# Patient Record
Sex: MALE | Race: WHITE | Employment: OTHER | URBAN - METROPOLITAN AREA
[De-identification: names, ages, dates, MRNs, and addresses within clinical notes are randomized per-mention and may not be internally consistent; named-entity substitution may affect disease eponyms.]

---

## 2017-01-04 RX ORDER — ONDANSETRON 4 MG/1
4 TABLET, ORALLY DISINTEGRATING ORAL
Qty: 90 TAB | Refills: 5 | Status: CANCELLED | OUTPATIENT
Start: 2017-01-04

## 2017-01-09 ENCOUNTER — OFFICE VISIT (OUTPATIENT)
Dept: NEUROLOGY | Age: 82
End: 2017-01-09

## 2017-01-09 VITALS
HEART RATE: 66 BPM | OXYGEN SATURATION: 98 % | DIASTOLIC BLOOD PRESSURE: 74 MMHG | RESPIRATION RATE: 18 BRPM | SYSTOLIC BLOOD PRESSURE: 124 MMHG

## 2017-01-09 DIAGNOSIS — G31.9 CEREBELLAR ATROPHY (HCC): Primary | ICD-10-CM

## 2017-01-09 DIAGNOSIS — R27.0 ATAXIA: ICD-10-CM

## 2017-01-09 DIAGNOSIS — R42 DIZZINESS: ICD-10-CM

## 2017-01-09 RX ORDER — GABAPENTIN 300 MG/1
CAPSULE ORAL
Qty: 60 CAP | Refills: 2 | Status: SHIPPED | OUTPATIENT
Start: 2017-01-09 | End: 2017-03-23 | Stop reason: SDUPTHER

## 2017-01-09 NOTE — MR AVS SNAPSHOT
Visit Information Date & Time Provider Department Dept. Phone Encounter #  
 1/9/2017  2:40 PM Ed [address] [facility] [phone] Follow-up Instructions Return in about 2 months (around 3/9/2017). Your Appointments 3/13/2017  2:30 PM  
ROUTINE CARE with Sara Abbasi DO [facility] Internal Medicine ([facility]) Appt Note: 3 month f/u  
 [address] [facility] [address] [place] [address]  
[phone]  
  
   
 [address] [place] [address] [address] Upcoming Health Maintenance Date Due DTaP/Tdap/Td series (1 - Tdap) 12/22/1939 ZOSTER VACCINE AGE 60> 12/22/1978 GLAUCOMA SCREENING Q2Y 12/22/1983 MEDICARE YEARLY EXAM 12/22/1983 Pneumococcal 65+ Low/Medium Risk (2 of 2 - PPSV23) 11/11/2017 Allergies as of 1/9/2017  Review Complete On: 1/9/2017 By: Blayne Umana DO No Known Allergies Current Immunizations  Reviewed on 11/11/2016 Name Date Influenza High Dose Vaccine PF 9/23/2016 Pneumococcal Conjugate (PCV-13) 11/11/2016 Not reviewed this visit You Were Diagnosed With   
  
 Codes Comments Cerebellar atrophy    -  Primary ICD-10-CM: G31.9 ICD-9-CM: 331.9 Dizziness     ICD-10-CM: H30 ICD-9-CM: 780.4 Ataxia     ICD-10-CM: R27.0 ICD-9-CM: 999. 3 Vitals BP Pulse Resp SpO2 Smoking Status 124/74 66 18 98% Never Smoker Preferred Pharmacy Pharmacy Name Phone [facility], [address] [place] [address] [phone] Your Updated Medication List  
  
   
This list is accurate as of: 1/9/17  3:10 PM.  Always use your most recent med list.  
  
  
  
  
 AGGRENOX  mg per SR capsule Generic drug:  aspirin-dipyridamole Take 1 Cap by mouth two (2) times a day. amLODIPine 2.5 mg tablet Commonly known as:  Soumya Raman Take 1 Tab by mouth daily. CLARITIN 10 mg tablet Generic drug:  loratadine Take 10 mg by mouth.  
  
 gabapentin 300 mg capsule Commonly known as:  NEURONTIN Start 1 tablet qhs x 1 week, then increase to 2 tablets qhs  
  
 hydroCHLOROthiazide 25 mg tablet Commonly known as:  HYDRODIURIL Take 1 Tab by mouth daily. LOSARTAN PO Take 50 mg by mouth daily. NexIUM 40 mg capsule Generic drug:  esomeprazole Take  by mouth every other day. ondansetron 4 mg disintegrating tablet Commonly known as:  ZOFRAN ODT Take 1 Tab by mouth every eight (8) hours as needed for Nausea. PROSCAR 5 mg tablet Generic drug:  finasteride Take 5 mg by mouth daily. RAPAFLO 8 mg capsule Generic drug:  silodosin Take 8 mg by mouth daily (with breakfast). VITAMIN B-12 PO Take  by mouth. Prescriptions Sent to Pharmacy Refills  
 gabapentin (NEURONTIN) 300 mg capsule 2 Sig: Start 1 tablet qhs x 1 week, then increase to 2 tablets qhs  
 Class: Normal  
 Pharmacy: [facility] [address] Ph #: [phone] We Performed the Following HEAVY METALS PROFILE I, BLOOD [75034 CPT(R)] PARANEOPLASTIC AUTO-AB EVAL [[id number] Custom] Follow-up Instructions Return in about 2 months (around 3/9/2017). To-Do List   
 01/09/2017 Imaging:  MRA BRAIN W WO CONT   
  
 01/09/2017 Imaging:  MRA NECK W CONT   
  
 01/09/2017 Imaging:  MRI BRAIN W WO CONT Patient Instructions A Healthy Lifestyle: Care Instructions Your Care Instructions A healthy lifestyle can help you feel good, stay at a healthy weight, and have plenty of energy for both work and play. A healthy lifestyle is something you can share with your whole family.  
A healthy lifestyle also can lower your risk for serious health problems, such as high blood pressure, heart disease, and diabetes. You can follow a few steps listed below to improve your health and the health of your family. Follow-up care is a key part of your treatment and safety. Be sure to make and go to all appointments, and call your doctor if you are having problems. Its also a good idea to know your test results and keep a list of the medicines you take. How can you care for yourself at home? · Do not eat too much sugar, fat, or fast foods. You can still have dessert and treats now and then. The goal is moderation. · Start small to improve your eating habits. Pay attention to portion sizes, drink less juice and soda pop, and eat more fruits and vegetables. ¨ Eat a healthy amount of food. A 3-ounce serving of meat, for example, is about the size of a deck of cards. Fill the rest of your plate with vegetables and whole grains. ¨ Limit the amount of soda and sports drinks you have every day. Drink more water when you are thirsty. ¨ Eat at least 5 servings of fruits and vegetables every day. It may seem like a lot, but it is not hard to reach this goal. A serving or helping is 1 piece of fruit, 1 cup of vegetables, or 2 cups of leafy, raw vegetables. Have an apple or some carrot sticks as an afternoon snack instead of a candy bar. Try to have fruits and/or vegetables at every meal. 
· Make exercise part of your daily routine. You may want to start with simple activities, such as walking, bicycling, or slow swimming. Try to be active 30 to 60 minutes every day. You do not need to do all 30 to 60 minutes all at once. For example, you can exercise 3 times a day for 10 or 20 minutes. Moderate exercise is safe for most people, but it is always a good idea to talk to your doctor before starting an exercise program. 
· Keep moving. Chepe Singh the lawn, work in the garden, or [employer]. Take the stairs instead of the elevator at work. · If you smoke, quit. People who smoke have an increased risk for heart attack, stroke, cancer, and other lung illnesses. Quitting is hard, but there are ways to boost your chance of quitting tobacco for good. ¨ Use nicotine gum, patches, or lozenges. ¨ Ask your doctor about stop-smoking programs and medicines. ¨ Keep trying. In addition to reducing your risk of diseases in the future, you will notice some benefits soon after you stop using tobacco. If you have shortness of breath or asthma symptoms, they will likely get better within a few weeks after you quit. · Limit how much alcohol you drink. Moderate amounts of alcohol (up to 2 drinks a day for men, 1 drink a day for women) are okay. But drinking too much can lead to liver problems, high blood pressure, and other health problems. Family health If you have a family, there are many things you can do together to improve your health. · Eat meals together as a family as often as possible. · Eat healthy foods. This includes fruits, vegetables, lean meats and dairy, and whole grains. · Include your family in your fitness plan. Most people think of activities such as jogging or tennis as the way to fitness, but there are many ways you and your family can be more active. Anything that makes you breathe hard and gets your heart pumping is exercise. Here are some tips: 
¨ Walk to do errands or to take your child to school or the bus. ¨ Go for a family bike ride after dinner instead of watching TV. Where can you learn more? Go to http://tristian-catie.info/. Enter I558 in the search box to learn more about \"A Healthy Lifestyle: Care Instructions. \" Current as of: July 26, 2016 Content Version: 11.1 © 9865-9441 [employer]. Care instructions adapted under license by [employer] (which disclaims liability or warranty for this information).  If you have questions about a medical condition or this instruction, always ask your healthcare professional. [place] [address] any warranty or liability for your use of this information. Introducing [facility] [place] [facility]! [employer] introduces UPlanMe patient portal. Now you can access parts of your medical record, email your doctor's office, and request medication refills online. 1. In your internet browser, go to [email or web address] 2. Click on the First Time User? Click Here link in the Sign In box. You will see the New Member Sign Up page. 3. Enter your UPlanMe Access Code exactly as it appears below. You will not need to use this code after youve completed the sign-up process. If you do not sign up before the expiration date, you must request a new code. · UPlanMe Access Code: [email or web address] Expires: 4/9/2017  2:31 PM 
 
4. Enter the last four digits of your Social Security Number (xxxx) and Date of Birth (mm/dd/yyyy) as indicated and click Submit. You will be taken to the next sign-up page. 5. Create a UPlanMe ID. This will be your UPlanMe login ID and cannot be changed, so think of one that is secure and easy to remember. 6. Create a UPlanMe password. You can change your password at any time. 7. Enter your Password Reset Question and Answer. This can be used at a later time if you forget your password. 8. Enter your e-mail address. You will receive e-mail notification when new information is available in [address] 9. Click Sign Up. You can now view and download portions of your medical record. 10. Click the Download Summary menu link to download a portable copy of your medical information. If you have questions, please visit the Frequently Asked Questions section of the UPlanMe website. Remember, UPlanMe is NOT to be used for urgent needs. For medical emergencies, dial 911. Now available from your iPhone and Android! Please provide this summary of care documentation to your next provider. Your primary care clinician is listed as Flavio Howard. If you have any questions after today's visit, please call [phone].

## 2017-01-09 NOTE — PATIENT INSTRUCTIONS

## 2017-01-09 NOTE — PROGRESS NOTES
Neurology Clinic Follow up Note    Patient ID:  Tanesha Matos  [id number]  56 y.o.  12/22/1918      Mr. Brody Roldan is here for follow up today of dizziness, dysarthria      Interval History:   Pt here for f/u regarding dizziness, dysarthria, ataxia. Residing at [place] [address]. Feels sx are progressive. Several falls since last visit. Using a wheelchair at all times. Using a bar to reduce need for transfers. Speech is more slurred. Saw speech therapy but did not feel this was helpful. Still reporting dizziness/lightheadedness, nausea/vomiting with any movement. Saw ENT and did not think this was a peripheral issue. Reporting new onset jerking/twitching of his legs x 1 month lasting all night long and affecting sleep. Denies LE paresthesias. Difficulty with blurred vision ongoing for years but worsening over the past month. Feels it is becoming more difficulty to control his hands. Difficulty feeding himself. MRI Brain completed w/o posterior circ ischemia but notable cerebellar atrophy. No FHx of similar gait/balance issues. PMHx/ PSHx/ FHx/ SHx:  Reviewed and unchanged previous visit. ROS:  Comprehensive review of systems negative except for as noted above. Objective:       Meds:  Current Outpatient Prescriptions   Medication Sig Dispense Refill    ondansetron (ZOFRAN ODT) 4 mg disintegrating tablet Take 1 Tab by mouth every eight (8) hours as needed for Nausea. 90 Tab 5    hydroCHLOROthiazide (HYDRODIURIL) 25 mg tablet Take 1 Tab by mouth daily. 90 Tab 3    amLODIPine (NORVASC) 2.5 mg tablet Take 1 Tab by mouth daily. 90 Tab 3    aspirin-dipyridamole (AGGRENOX)  mg per SR capsule Take 1 Cap by mouth two (2) times a day.  silodosin (RAPAFLO) 8 mg capsule Take 8 mg by mouth daily (with breakfast).  loratadine (CLARITIN) 10 mg tablet Take 10 mg by mouth.  finasteride (PROSCAR) 5 mg tablet Take 5 mg by mouth daily.       esomeprazole (NEXIUM) 40 mg capsule Take  by mouth every other day.  LOSARTAN PO Take 50 mg by mouth daily.  CYANOCOBALAMIN, VITAMIN B-12, (VITAMIN B-12 PO) Take  by mouth. Exam:  Visit Vitals    /74    Pulse 66    Resp 18    SpO2 98%     NEUROLOGICAL EXAM:  General: Awake, alert, severe dysarthria. CN: PERRL, EOMI with impaired saccades, +nystagmus, VFF to confrontation, facial sensation and strength are normal and symmetric, hearing is intact to finger rub bilaterally, palate and tongue movements are intact and symmetric. Motor: 5/5 UE, 4/5 proximal LE b/l  Reflexes: Deferred  Coordination: slightly ataxic FTN b/l  Sensation: LT intact throughout. Gait: Wheelchair bound, not able to ambulate    LABS  Results for orders placed or performed in visit on 11/07/16   VITAMIN E   Result Value Ref Range    Vitamin E (Alpha Tocopherol) 18.6 (H) 5.3 - 17.5 mg/L   VITAMIN B12   Result Value Ref Range    Vitamin B12 1669 (H) 211 - 946 pg/mL   TSH 3RD GENERATION   Result Value Ref Range    TSH 1.970 0.450 - 4.500 uIU/mL   COPPER   Result Value Ref Range    Copper, serum 107 72 - 166 ug/dL   CBC+HEMATOLOGY REVIEW   Result Value Ref Range    WBC 6.8 3.4 - 10.8 x10E3/uL    RBC 3.64 (L) 4.14 - 5.80 x10E6/uL    HGB 9.6 (L) 12.6 - 17.7 g/dL    HCT 29.8 (L) 37.5 - 51.0 %    MCV 82 79 - 97 fL    MCH 26.4 (L) 26.6 - 33.0 pg    MCHC 32.2 31.5 - 35.7 g/dL    RDW 14.3 12.3 - 15.4 %    NEUTROPHILS 62 %    LYMPHOCYTES 24 %    MONOCYTES 11 %    EOSINOPHILS 2 %    BASOPHILS 1 %    ABS. NEUTROPHILS 4.2 1.4 - 7.0 X10E3/uL    ABS. LYMPHOCYTES 1.6 0.7 - 3.1 X10E3/uL    ABS. MONOCYTES 0.7 0.1 - 0.9 X10E3/uL    ABS. EOSINOPHILS 0.1 0.0 - 0.4 X10E3/uL    ABS.  BASOPHILS 0.1 0.0 - 0.2 X10E3/uL    Differential comment Comment     RBC COMMENT RBC's appear normal. Normal    PLATELET COMMENT Adequate Adequate       IMAGING:  MRI Results (most recent):    Results from [facility] encounter on 10/27/16   MRI BRAIN WO CONT   Narrative EXAM:  MRI BRAIN WO CONT  History: Dizziness, and balance, speech changes, abnormal eye movements  INDICATION:  dizziness/imbalance, dysarthria, nystagmus r/o posterior circ  infarction    COMPARISON: None. CONTRAST:  None. TECHNIQUE: Sagittal T1, axial FLAIR, T2,T1 and gradient echo images as well as  coronal T2 weighted images and axial diffusion weighted images of the head were  obtained. FINDINGS:    There is mild/moderate for patient age sulcal and ventricular prominence. Left  temporal atrophy greater than right temporal atrophy. In the periventricular  white matter there are scattered foci of increased signal intensity, this is  mild. . There is no Chiari or syrinx. The pituitary gland and infundibulum are  unremarkable. There is no intracranial hemorrhage. There is no intracranial  mass. There is no skull base mass. Major intracranial vascular flow-voids are  grossly unremarkable. The cerebellopontine angles are unremarkable as well. The  orbits are symmetric. The cavernous sinuses are symmetric as well. There is no  evidence of mass, hemorrhage, acute infarct or abnormal extra-axial fluid  collection. Normal appearing flow-voids are present in the vertebral, basilar  and carotid artery systems. The craniocervical junction is normal.    There is right frontal sinus disease which is moderate. The left frontal sinus  is clear minimal anterior ethmoid sinus disease. Maxillary sinuses are within  normal limits. Impression IMPRESSION:    There is no acute intracranial process. No intracranial mass, hemorrhage, acute infarction. There is mild to moderate left greater than right temporal predominant cerebral  atrophy. Mild chronic microvascular ischemic change for patient age. Assessment:     Encounter Diagnoses     ICD-10-CM ICD-9-CM   1. 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 Dizziness R42 780.4   3.  Ataxia R27.0 781.3     79 yo male vasculopath here for f/u of chronic dizziness exacerbated by positional changes/movement x 5+ years with more recent increasing severity/frequency, associated imbalance, slight ataxia,  and severe dysarthria. Neurological examination today with worsening dysarthria, impaired saccades, nystagmus, b/l UE ataxia and gait imbalance. Initial MRI Brain completed and no evidence of posterior circulation ischemia. Mod to severe cerebellar atophy present on review. 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 Given relatively rapid recent progression, favor repeat neuroimaging to assess for interval change and to include vessel imaging to ensure patency of vertebrobasilar arteries. Although rare, will assess for paraneoplastic, toxic etiologies as well. For new onset periodic limb movements in the evenings, will start low dose gabapentin and titrate as needed. Plan:   Paraneoplastic panel, Heavy metal screening  MRI Brain WWO/MRA Head/Neck  Start gabapentin 300mg qhs x 1 week, then increase if needed to 600mg qhs  Discussed need for higher level of care/nursing facility placement with pt and daughter given clinical worsening and recent recurrent falls. Both are in agreement. Follow-up Disposition:  Return in about 2 months (around 3/9/2017).     Signed:  Mayo Mckenna DO  1/9/2017  1:58 PM

## 2017-01-10 DIAGNOSIS — R42 DIZZINESS: Primary | ICD-10-CM

## 2017-01-10 DIAGNOSIS — R27.0 ATAXIA: ICD-10-CM

## 2017-01-11 ENCOUNTER — TELEPHONE (OUTPATIENT)
Dept: NEUROLOGY | Age: 82
End: 2017-01-11

## 2017-01-11 NOTE — TELEPHONE ENCOUNTER
Spoke with daughter, informed her that MRI/MRA were denied, new order for carotid ultra sound ordered. She verbalized understanding.

## 2017-01-12 ENCOUNTER — TELEPHONE (OUTPATIENT)
Dept: NEUROLOGY | Age: 82
End: 2017-01-12

## 2017-01-12 NOTE — TELEPHONE ENCOUNTER
Pt daughter has a few questions in regards to the conversation she had with Deny Even yesterday. Would also some information about nursing homes.    Please call home first,  [phone] or mobile second, [phone]

## 2017-01-13 NOTE — TELEPHONE ENCOUNTER
Spoke with daughter, informed her MRA's were denied, not MRI W WO brain. Provided information on assistance with nursing home placement.

## 2017-01-14 LAB
ACHR BIND AB SER-SCNC: 0 NMOL/L
AMPHIPHYSIN AB TITR SER: NEGATIVE TITER
ARSENIC BLD-MCNC: 3 UG/L (ref 2–23)
CLINICAL BIOCHEMIST REVIEW: NORMAL
CV2 IGG TITR SER: NEGATIVE TITER
GLIAL NUC TYPE 1 AB TITR SER: NEGATIVE TITER
HU1 AB TITR SER: NEGATIVE TITER
HU2 AB TITR SER IF: NEGATIVE TITER
HU3 AB TITR SER: NEGATIVE TITER
LEAD BLD-MCNC: 2 UG/DL (ref 0–19)
MERCURY BLD-MCNC: NORMAL UG/L (ref 0–14.9)
NACHR AB SER-SCNC: 0 NMOL/L
PCA-1 AB TITR SER: NEGATIVE TITER
PCA-2 AB TITR SER: NEGATIVE TITER
PCA-TR AB TITR SER: NEGATIVE TITER
REFLEX ADDED: NORMAL
STRIA MUS AB TITR SER: NEGATIVE TITER
VGCC-N BIND AB SER-SCNC: 0 NMOL/L
VGCC-P/Q BIND AB SER-SCNC: 0 NMOL/L
VGKC AB SER-SCNC: 0 NMOL/L

## 2017-02-17 ENCOUNTER — TELEPHONE (OUTPATIENT)
Dept: NEUROLOGY | Age: 82
End: 2017-02-17

## 2017-02-19 ENCOUNTER — APPOINTMENT (OUTPATIENT)
Dept: GENERAL RADIOLOGY | Age: 82
End: 2017-02-19
Attending: STUDENT IN AN ORGANIZED HEALTH CARE EDUCATION/TRAINING PROGRAM
Payer: MEDICARE

## 2017-02-19 ENCOUNTER — HOSPITAL ENCOUNTER (EMERGENCY)
Age: 82
Discharge: NURSING, REHAB OR LONG-TERM CARE | End: 2017-02-20
Attending: STUDENT IN AN ORGANIZED HEALTH CARE EDUCATION/TRAINING PROGRAM
Payer: MEDICARE

## 2017-02-19 ENCOUNTER — APPOINTMENT (OUTPATIENT)
Dept: CT IMAGING | Age: 82
End: 2017-02-19
Attending: STUDENT IN AN ORGANIZED HEALTH CARE EDUCATION/TRAINING PROGRAM
Payer: MEDICARE

## 2017-02-19 VITALS
DIASTOLIC BLOOD PRESSURE: 51 MMHG | BODY MASS INDEX: 28.25 KG/M2 | OXYGEN SATURATION: 95 % | SYSTOLIC BLOOD PRESSURE: 121 MMHG | TEMPERATURE: 98.1 F | HEART RATE: 75 BPM | RESPIRATION RATE: 18 BRPM | WEIGHT: 175 LBS

## 2017-02-19 DIAGNOSIS — S70.02XA HEMATOMA OF HIP, LEFT, INITIAL ENCOUNTER: Primary | ICD-10-CM

## 2017-02-19 PROCEDURE — 72170 X-RAY EXAM OF PELVIS: CPT

## 2017-02-19 PROCEDURE — 74011250637 HC RX REV CODE- 250/637: Performed by: STUDENT IN AN ORGANIZED HEALTH CARE EDUCATION/TRAINING PROGRAM

## 2017-02-19 PROCEDURE — 99284 EMERGENCY DEPT VISIT MOD MDM: CPT

## 2017-02-19 PROCEDURE — 72192 CT PELVIS W/O DYE: CPT

## 2017-02-19 RX ORDER — LOSARTAN POTASSIUM 25 MG/1
50 TABLET ORAL DAILY
COMMUNITY

## 2017-02-19 RX ORDER — HYDROCODONE BITARTRATE AND ACETAMINOPHEN 5; 325 MG/1; MG/1
1 TABLET ORAL
Qty: 8 TAB | Refills: 0 | Status: SHIPPED | OUTPATIENT
Start: 2017-02-19 | End: 2017-02-21

## 2017-02-19 RX ORDER — HYDROCODONE BITARTRATE AND ACETAMINOPHEN 10; 325 MG/1; MG/1
1 TABLET ORAL
Status: COMPLETED | OUTPATIENT
Start: 2017-02-19 | End: 2017-02-19

## 2017-02-19 RX ADMIN — HYDROCODONE BITARTRATE AND ACETAMINOPHEN 1 TABLET: 10; 325 TABLET ORAL at 23:18

## 2017-02-20 ENCOUNTER — HOSPITAL ENCOUNTER (OUTPATIENT)
Dept: ULTRASOUND IMAGING | Age: 82
Discharge: HOME | End: 2017-02-20
Attending: PSYCHIATRY & NEUROLOGY
Payer: MEDICARE

## 2017-02-20 ENCOUNTER — HOSPITAL ENCOUNTER (OUTPATIENT)
Dept: MRI IMAGING | Age: 82
Discharge: HOME | End: 2017-02-20
Attending: PSYCHIATRY & NEUROLOGY
Payer: MEDICARE

## 2017-02-20 VITALS — BODY MASS INDEX: 28.25 KG/M2 | WEIGHT: 175 LBS

## 2017-02-20 DIAGNOSIS — R42 DIZZINESS: ICD-10-CM

## 2017-02-20 DIAGNOSIS — R27.0 ATAXIA: ICD-10-CM

## 2017-02-20 DIAGNOSIS — G31.9 CEREBELLAR ATROPHY (HCC): ICD-10-CM

## 2017-02-20 LAB — CREAT BLD-MCNC: 1.2 MG/DL (ref 0.6–1.3)

## 2017-02-20 PROCEDURE — 70553 MRI BRAIN STEM W/O & W/DYE: CPT

## 2017-02-20 PROCEDURE — 82565 ASSAY OF CREATININE: CPT

## 2017-02-20 PROCEDURE — A9585 GADOBUTROL INJECTION: HCPCS | Performed by: PSYCHIATRY & NEUROLOGY

## 2017-02-20 PROCEDURE — 93880 EXTRACRANIAL BILAT STUDY: CPT

## 2017-02-20 PROCEDURE — 74011250636 HC RX REV CODE- 250/636: Performed by: PSYCHIATRY & NEUROLOGY

## 2017-02-20 RX ADMIN — GADOBUTROL 7.5 ML: 604.72 INJECTION INTRAVENOUS at 13:00

## 2017-02-20 NOTE — ED NOTES
Robert Brady RN at [facility] notified of pt's return. ETA from pt transport 40mins per [facility]. Pt and family updated at this time.

## 2017-02-20 NOTE — ED PROVIDER NOTES
HPI Comments: 80 y.o. male with past medical history significant for HTN, MI, stroke, peripheral neuropathy who presents from [facility] via EMS accompanied by daughter with chief complaint of leg pain. Daughter states patient has been complaining of left leg pain from the hip to the knee for \"a while\" that has worsened today. Daughter states patient's most recent fall was 1 week ago. Daughter states patient has not received any XR's. There are no other acute medical concerns at this time. Social hx: never smoker, no alcohol  PCP: Joey Chapman, DO    Note written by Zandra Medina, as dictated by Jade Rivas MD 9:52 PM     The history is provided by a relative. No  was used. Past Medical History:   Diagnosis Date    Hypertension     Myocardial infarct, old 26    Peripheral neuropathy ([facility] [address]     Peripheral polyneuropathy 10/5/2016    Stroke [facility])        Past Surgical History:   Procedure Laterality Date    Hx hernia repair  1987    Hx orthopaedic       trigger finger    Pr cardiac surg procedure unlist  1991     Bypass 1991 and carotid artery          Family History:   Problem Relation Age of Onset    Cancer Mother      colon cancer    Heart Disease Father     Heart Disease Sister     Cancer Sister      pancreatic cancer       Social History     Social History    Marital status:      Spouse name: N/A    Number of children: N/A    Years of education: N/A     Occupational History    Not on file. Social History Main Topics    Smoking status: Never Smoker    Smokeless tobacco: Never Used    Alcohol use No      Comment: none     Drug use: No    Sexual activity: No      Comment:  has 2 children     Other Topics Concern    Not on file     Social History Narrative         ALLERGIES: Review of patient's allergies indicates no known allergies. Review of Systems   Constitutional: Negative for chills, diaphoresis, fatigue and fever. HENT: Negative for congestion, rhinorrhea, sinus pressure, sore throat, trouble swallowing and voice change. Eyes: Negative for photophobia and visual disturbance. Respiratory: Negative for cough, chest tightness and shortness of breath. Cardiovascular: Negative for chest pain, palpitations and leg swelling. Gastrointestinal: Negative for abdominal pain, blood in stool, constipation, diarrhea, nausea and vomiting. Musculoskeletal: Positive for arthralgias (left leg). Negative for myalgias and neck pain. Neurological: Negative for dizziness, light-headedness, numbness and headaches. Vitals:    02/19/17 2106   BP: 169/60   Pulse: 75   Resp: 18   Temp: 98.2 °F (36.8 °C)   SpO2: 99%   Weight: 79.4 kg (175 lb)            Physical Exam   Constitutional: He is oriented to person, place, and time. He appears well-developed and well-nourished. No distress. HENT:   Head: Normocephalic and atraumatic. Nose: Nose normal.   Mouth/Throat: Oropharynx is clear and moist. No oropharyngeal exudate. Eyes: Conjunctivae and EOM are normal. Right eye exhibits no discharge. Left eye exhibits no discharge. No scleral icterus. Neck: Normal range of motion. Neck supple. No JVD present. No tracheal deviation present. No thyromegaly present. Cardiovascular: Normal rate, regular rhythm, normal heart sounds and intact distal pulses. Exam reveals no gallop and no friction rub. No murmur heard. Pulmonary/Chest: Effort normal and breath sounds normal. No stridor. No respiratory distress. He has no wheezes. He has no rales. He exhibits no tenderness. Abdominal: Bowel sounds are normal. He exhibits no distension and no mass. There is no tenderness. There is no rebound. Musculoskeletal: Normal range of motion. He exhibits no edema or tenderness. Left leg tender at left lateral hip, mild deformity noted   Lymphadenopathy:     He has no cervical adenopathy.    Neurological: He is alert and oriented to person, place, and time. No cranial nerve deficit. Coordination normal.   Skin: Skin is warm and dry. No rash noted. He is not diaphoretic. No erythema. No pallor. Psychiatric: He has a normal mood and affect. His behavior is normal. Judgment and thought content normal.   Note written by Zandra Pruitt, as dictated by Andreas Dumont MD 9:55 PM      MDM  Number of Diagnoses or Management Options  Diagnosis management comments: Hip fx, femur fx, hip pain. 79 y/o male presenting to ED for left hip pain. Pt had fall aprox 1 week ago and has had PT aprox 4 times since fall. Will xray left hip to eval.  If negative will eval with CT abd/pelvis. Will treat pain and reasess. Amount and/or Complexity of Data Reviewed  Tests in the radiology section of CPT®: ordered and reviewed  Obtain history from someone other than the patient: yes  Review and summarize past medical records: yes    Risk of Complications, Morbidity, and/or Mortality  Presenting problems: moderate  Diagnostic procedures: moderate  Management options: moderate    Patient Progress  Patient progress: stable    ED Course       Procedures    LABS REVIEWED:  Labs Reviewed - No data to display    IMAGING REVIEWED:  No results found.

## 2017-02-20 NOTE — ED TRIAGE NOTES
Pt with left leg (thigh) pain x3 days. Per daughter, pt is non ambulatory, but has been having falls while transferring with staff. [facility]. Pt is wheelchair bound from worsening peripheral neuropathy. Daughter describes pt with \"deterioration of the part of the brain that controls motor function and speech. This is why he has slurred speech. \" Pt was scheduled for MRI tomorrow.

## 2017-02-20 NOTE — DISCHARGE INSTRUCTIONS
We hope that we have addressed all of your medical concerns. The examination and treatment you received in the Emergency Department were for an emergent problem and were not intended as complete care. It is important that you follow up with your healthcare provider(s) for ongoing care. If your symptoms worsen or do not improve as expected, and you are unable to reach your usual health care provider(s), you should return to the Emergency Department. Today's healthcare is undergoing tremendous change, and patient satisfaction surveys are one of the many tools to assess the quality of medical care. You may receive a survey from the [employer] regarding your experience in the Emergency Department. I hope that your experience has been completely positive, particularly the medical care that I provided. As such, please participate in the survey; anything less than excellent does not meet my expectations or intentions. [facility] and 8 [facility] participate in nationally recognized quality of care measures. If your blood pressure is greater than 120/80, as reported below, we urge that you seek medical care to address the potential of high blood pressure, commonly known as hypertension. Hypertension can be hereditary or can be caused by certain medical conditions, pain, stress, or \"white coat syndrome. \"       Please make an appointment with your health care provider(s) for follow up of your Emergency Department visit. VITALS:   Patient Vitals for the past 8 hrs:   Temp Pulse Resp BP SpO2   02/19/17 2157 98.1 °F (36.7 °C) - - - -   02/19/17 2106 98.2 °F (36.8 °C) 75 18 169/60 99 %          Thank you for allowing us to provide you with medical care today. We realize that you have many choices for your emergency care needs. Please choose us in the future for any continued health care needs. Belkys Bailey MD ZAYNAB Jackson [facility] 70: [phone]            No results found for this or any previous visit (from the past 24 hour(s)). Xr Pelv Ap Only    Result Date: 2/19/2017  INDICATION:   concern for left hip dislocation vs. fx COMPARISON: None FINDINGS: AP view of the pelvis slightly degraded by large body habitus. No definite fracture or dislocation. The pubic symphysis and sacroiliac joints appear intact. IMPRESSION: Technical factors as above. No definite fracture though if high clinical concern consider CT. Ct Pelv Wo Cont    Result Date: 2/19/2017  ****PRELIMINARY REPORT**** Osteopenia with no evidence of acute fracture. Heterogeneous thickening left ileus psoas muscle and adjacent stranding likely represent underlying hematoma Preliminary report was provided by Dr. Alina Rothman, the on-call radiologist, at [phone] hours Final report to follow. ****END PRELIMINARY REPORT****             Hip Pain: Care Instructions  Your Care Instructions  Hip pain may be caused by many things, including overuse, a fall, or a twisting movement. Another cause of hip pain is arthritis. Your pain may increase when you stand up, walk, or squat. The pain may come and go or may be constant. Home treatment can help relieve hip pain, swelling, and stiffness. If your pain is ongoing, you may need more tests and treatment. Follow-up care is a key part of your treatment and safety. Be sure to make and go to all appointments, and call your doctor if you are having problems. Its also a good idea to know your test results and keep a list of the medicines you take. How can you care for yourself at home? · Take pain medicines exactly as directed. ¨ If the doctor gave you a prescription medicine for pain, take it as prescribed. ¨ If you are not taking a prescription pain medicine, ask your doctor if you can take an over-the-counter medicine. · Rest and protect your hip.  Take a break from any activity, including standing or walking, that may cause pain. · Put ice or a cold pack against your hip for 10 to 20 minutes at a time. Try to do this every 1 to 2 hours for the next 3 days (when you are awake) or until the swelling goes down. Put a thin cloth between the ice and your skin. · Sleep on your healthy side with a pillow between your knees, or sleep on your back with pillows under your knees. · If there is no swelling, you can put moist heat, a heating pad, or a warm cloth on your hip. Do gentle stretching exercises to help keep your hip flexible. · Learn how to prevent falls. Have your vision and hearing checked regularly. Wear slippers or shoes with a nonskid sole. · Stay at a healthy weight. · Wear comfortable shoes. When should you call for help? Call 911 anytime you think you may need emergency care. For example, call if:  · You have sudden chest pain and shortness of breath, or you cough up blood. · You are not able to stand or walk or bear weight. · Your buttocks, legs, or feet feel numb or tingly. · Your leg or foot is cool or pale or changes color. · You have severe pain. Call your doctor now or seek immediate medical care if:  · You have signs of infection, such as:  ¨ Increased pain, swelling, warmth, or redness in the hip area. ¨ Red streaks leading from the hip area. ¨ Pus draining from the hip area. ¨ A fever. · You have signs of a blood clot, such as:  ¨ Pain in your calf, back of the knee, thigh, or groin. ¨ Redness and swelling in your leg or groin. · You are not able to bend, straighten, or move your leg normally. · You have trouble urinating or having bowel movements. Watch closely for changes in your health, and be sure to contact your doctor if:  · You do not get better as expected. Where can you learn more? Go to http://tristian-catie.info/. Enter F316 in the search box to learn more about \"Hip Pain: Care Instructions. \"  Current as of: May 27, 2016  Content Version: 11.1  © 5733-1897 [employer]. Care instructions adapted under license by [employer] ([email or web address]) (which disclaims liability or warranty for this information). If you have questions about a medical condition or this instruction, always ask your healthcare professional. [address] any warranty or liability for your use of this information.

## 2017-03-13 ENCOUNTER — OFFICE VISIT (OUTPATIENT)
Dept: INTERNAL MEDICINE CLINIC | Age: 82
End: 2017-03-13

## 2017-03-13 VITALS
OXYGEN SATURATION: 98 % | SYSTOLIC BLOOD PRESSURE: 105 MMHG | TEMPERATURE: 98.3 F | RESPIRATION RATE: 17 BRPM | HEART RATE: 68 BPM | DIASTOLIC BLOOD PRESSURE: 42 MMHG

## 2017-03-13 DIAGNOSIS — G31.9 NEURODEGENERATIVE DISORDER (HCC): Primary | ICD-10-CM

## 2017-03-13 DIAGNOSIS — R11.0 CHRONIC NAUSEA: ICD-10-CM

## 2017-03-13 DIAGNOSIS — R42 DIZZINESS: ICD-10-CM

## 2017-03-13 DIAGNOSIS — I25.10 CORONARY ARTERY DISEASE INVOLVING NATIVE HEART WITHOUT ANGINA PECTORIS, UNSPECIFIED VESSEL OR LESION TYPE: ICD-10-CM

## 2017-03-13 DIAGNOSIS — R47.1 DYSARTHRIA: ICD-10-CM

## 2017-03-13 DIAGNOSIS — G31.9 CEREBELLAR ATROPHY (HCC): ICD-10-CM

## 2017-03-13 DIAGNOSIS — I67.89 CEREBRAL MICROVASCULAR DISEASE: ICD-10-CM

## 2017-03-13 DIAGNOSIS — R26.9 GAIT DIFFICULTY: ICD-10-CM

## 2017-03-13 RX ORDER — ATORVASTATIN CALCIUM 40 MG/1
TABLET, FILM COATED ORAL DAILY
COMMUNITY
End: 2017-03-13 | Stop reason: SINTOL

## 2017-03-13 RX ORDER — ASCORBIC ACID 500 MG
TABLET ORAL
COMMUNITY

## 2017-03-13 RX ORDER — FERROUS FUMARATE 324(106)MG
TABLET ORAL
COMMUNITY
End: 2017-03-23

## 2017-03-13 RX ORDER — FUROSEMIDE 20 MG/1
TABLET ORAL DAILY
COMMUNITY
End: 2017-03-13 | Stop reason: ALTCHOICE

## 2017-03-13 NOTE — MR AVS SNAPSHOT
Visit Information Date & Time Provider Department Dept. Phone Encounter #  
 3/13/2017  2:[facility] [phone] [id number] Follow-up Instructions Return in about 3 months (around 6/13/2017). Your Appointments 3/23/2017 10:40 AM  
Follow Up with Angeline Kehr, DO [facility] at [address] [facility] Appt Note: 2 month f/u dizziness 1/9/17; 2 month f/u dizziness 1/9/17  
 [address] [place] [id number]  
[phone]  
  
   
 [address]  [address] Upcoming Health Maintenance Date Due DTaP/Tdap/Td series (1 - Tdap) 12/22/1939 ZOSTER VACCINE AGE 60> 12/22/1978 GLAUCOMA SCREENING Q2Y 12/22/1983 MEDICARE YEARLY EXAM 12/22/1983 Pneumococcal 65+ Low/Medium Risk (2 of 2 - PPSV23) 11/11/2017 Allergies as of 3/13/2017  Review Complete On: 3/13/2017 By: Parker Sher DO No Known Allergies Current Immunizations  Reviewed on 11/11/2016 Name Date Influenza High Dose Vaccine PF 9/23/2016 Pneumococcal Conjugate (PCV-13) 11/11/2016 Not reviewed this visit You Were Diagnosed With   
  
 Codes Comments Neurodegenerative disorder    -  Primary ICD-10-CM: G31.9 ICD-9-CM: 331.9 Cerebellar atrophy     ICD-10-CM: G31.9 ICD-9-CM: 331.9 Cerebral microvascular disease     ICD-10-CM: I67.9 ICD-9-CM: 437.9 Dizziness     ICD-10-CM: K44 ICD-9-CM: 780.4 Gait difficulty     ICD-10-CM: R26.9 ICD-9-CM: 251. 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  
  
   
This list is accurate as of: 3/13/17  3:14 PM.  Always use your most recent med list.  
  
  
  
  
 AGGRENOX  mg per SR capsule Generic drug:  aspirin-dipyridamole Take 1 Cap by mouth two (2) times a day. amLODIPine 2.5 mg tablet Commonly known as:  Dooley Lamine Take 1 Tab by mouth daily. CLARITIN 10 mg tablet Generic drug:  loratadine Take 10 mg by mouth. ferrous fumarate 324 mg (106 mg iron) Tab Take  by mouth.  
  
 gabapentin 300 mg capsule Commonly known as:  NEURONTIN Start 1 tablet qhs x 1 week, then increase to 2 tablets qhs  
  
 hydroCHLOROthiazide 25 mg tablet Commonly known as:  HYDRODIURIL Take 1 Tab by mouth daily. losartan 25 mg tablet Commonly known as:  COZAAR Take 50 mg by mouth daily. NexIUM 40 mg capsule Generic drug:  esomeprazole Take  by mouth every other day. ondansetron 4 mg disintegrating tablet Commonly known as:  ZOFRAN ODT Take 1 Tab by mouth every eight (8) hours as needed for Nausea. PROSCAR 5 mg tablet Generic drug:  finasteride Take 5 mg by mouth daily. RAPAFLO 8 mg capsule Generic drug:  silodosin Take 8 mg by mouth daily (with breakfast). VITAMIN B-12 PO Take  by mouth. VITAMIN C 500 mg tablet Generic drug:  ascorbic acid (vitamin C) Take  by mouth. Follow-up Instructions Return in about 3 months (around 6/13/2017). Introducing [facility]! Erika Amezcua introduces [employer] patient portal. Now you can access parts of your medical record, email your doctor's office, and request medication refills online. 1. In your internet browser, go to [email or web address] 2. Click on the First Time User? Click Here link in the Sign In box.  You will see the New Member Sign Up page. 3. Enter your [employer] Access Code exactly as it appears below. You will not need to use this code after youve completed the sign-up process. If you do not sign up before the expiration date, you must request a new code. · [employer] Access Code: [email or web address] Expires: 4/9/2017  3:31 PM 
 
4. Enter the last four digits of your Social Security Number (xxxx) and Date of Birth (mm/dd/yyyy) as indicated and click Submit. You will be taken to the next sign-up page. 5. Create a [employer] ID. This will be your [employer] login ID and cannot be changed, so think of one that is secure and easy to remember. 6. Create a [employer] password. You can change your password at any time. 7. Enter your Password Reset Question and Answer. This can be used at a later time if you forget your password. 8. Enter your e-mail address. You will receive e-mail notification when new information is available in [address]. 9. Click Sign Up. You can now view and download portions of your medical record. 10. Click the Download Summary menu link to download a portable copy of your medical information. If you have questions, please visit the Frequently Asked Questions section of the [employer] website. Remember, [employer] is NOT to be used for urgent needs. For medical emergencies, dial 911. Now available from your iPhone and Android! Please provide this summary of care documentation to your next provider. Your primary care clinician is listed as Flavio Howard. If you have any questions after today's visit, please call [phone].

## 2017-03-13 NOTE — PROGRESS NOTES
Reviewed record in preparation for visit and have obtained necessary documentation. Identified pt with two pt identifiers(name and ). Health Maintenance Due   Topic    DTaP/Tdap/Td series (1 - Tdap)    ZOSTER VACCINE AGE 60>     GLAUCOMA SCREENING Q2Y     MEDICARE YEARLY EXAM          Chief Complaint   Patient presents with    Follow Up Chronic Condition    Dizziness          Learning Assessment:  :     Learning Assessment 10/5/2016   PRIMARY LEARNER Patient   PRIMARY LANGUAGE ENGLISH   LEARNER PREFERENCE PRIMARY READING   ANSWERED BY patient   RELATIONSHIP SELF       Depression Screening:  :     PHQ 2 / 9, over the last two weeks 2016   Little interest or pleasure in doing things Not at all   Feeling down, depressed or hopeless Not at all   Total Score PHQ 2 0       Fall Risk Assessment:  :     Fall Risk Assessment, last 12 mths 2016   Able to walk? No   Fall in past 12 months? -   Number of falls in past 12 months -       Abuse Screening:  :     Abuse Screening Questionnaire 2016   Do you ever feel afraid of your partner? N   Are you in a relationship with someone who physically or mentally threatens you? N   Is it safe for you to go home? Y       Coordination of Care Questionnaire:  :     1) Have you been to an emergency room, urgent care clinic since your last visit? yes   Hospitalized since your last visit? no             2) Have you seen or consulted any other health care providers outside of [address] [place] [address] since your last visit? no  (Include any pap smears or colon screenings in this section.)    3) Do you have an Advance Directive on file? yes    4) Are you interested in receiving information on Advance Directives? NO      Patient is accompanied by patient I have received verbal consent from  Adrianna Pat to discuss any/all medical information while they are present in the room.

## 2017-03-23 ENCOUNTER — OFFICE VISIT (OUTPATIENT)
Dept: NEUROLOGY | Age: 82
End: 2017-03-23

## 2017-03-23 VITALS
RESPIRATION RATE: 18 BRPM | SYSTOLIC BLOOD PRESSURE: 134 MMHG | DIASTOLIC BLOOD PRESSURE: 60 MMHG | HEART RATE: 64 BPM | OXYGEN SATURATION: 94 %

## 2017-03-23 DIAGNOSIS — R27.0 ATAXIA: ICD-10-CM

## 2017-03-23 DIAGNOSIS — G31.9 CEREBELLAR ATROPHY (HCC): ICD-10-CM

## 2017-03-23 DIAGNOSIS — R42 DIZZINESS: ICD-10-CM

## 2017-03-23 RX ORDER — FUROSEMIDE 20 MG/1
TABLET ORAL DAILY
COMMUNITY

## 2017-03-23 RX ORDER — LANOLIN ALCOHOL/MO/W.PET/CERES
CREAM (GRAM) TOPICAL
COMMUNITY

## 2017-03-23 RX ORDER — ATORVASTATIN CALCIUM 40 MG/1
TABLET, FILM COATED ORAL DAILY
COMMUNITY

## 2017-03-23 RX ORDER — GABAPENTIN 300 MG/1
600 CAPSULE ORAL
Qty: 180 CAP | Refills: 4 | Status: SHIPPED | OUTPATIENT
Start: 2017-03-23

## 2017-03-23 NOTE — PROGRESS NOTES
Neurology Clinic Follow up Note    Patient ID:  Tanesha Matos  [id number]  23 y.o.  12/22/1918      Mr. Brody Roldan is here for follow up today of dizziness, dysarthria      Interval History:   2 ED visits since we last met. One for pain in the leg with findings of hematoma L hip. Second visit was after sliding out of his wheelchair with laceration of the arm. Pt here for f/u regarding dizziness/vertigo exacerbated by movement (prior negative ENT eval), progressive dysarthria, ataxia. He states this is stable but daughter reports this may be a little worse since last visit. Swallowing ok, not choking on food. Occasional difficulty taking pills. No SOB. Endorsing blurred vision which is not new but appears progressive. Also reports difficulty with intrinsic hand movements. Started gabapentin for RLS which appears improved. He is currently taking gabapentin 600mg qhs. Still residing at [place] [address]. Family has explored a few NF but patient is resistant to moving. Indwelling catheter placed 12/2016 to prevent nocturnal falls. Engaged in PT once weekly. Now paying out of pocket as he has exhausted his insurance benefits for the year. MRI Brain completed w/o posterior circ ischemia but notable cerebellar atrophy. No FHx of similar gait/balance issues. CUS without significant stenosis of carotids or vertebral arteries. PMHx/ PSHx/ FHx/ SHx:  Reviewed and unchanged previous visit. ROS:  Comprehensive review of systems negative except for as noted above. Objective:       Meds:  Current Outpatient Prescriptions   Medication Sig Dispense Refill    atorvastatin (LIPITOR) 40 mg tablet Take  by mouth daily.  furosemide (LASIX) 20 mg tablet Take  by mouth daily.  ferrous sulfate 325 mg (65 mg iron) tablet Take  by mouth Daily (before breakfast).  ascorbic acid, vitamin C, (VITAMIN C) 500 mg tablet Take  by mouth.       losartan (COZAAR) 25 mg tablet Take 50 mg by mouth daily.      gabapentin (NEURONTIN) 300 mg capsule Start 1 tablet qhs x 1 week, then increase to 2 tablets qhs 60 Cap 2    amLODIPine (NORVASC) 2.5 mg tablet Take 1 Tab by mouth daily. 90 Tab 3    aspirin-dipyridamole (AGGRENOX)  mg per SR capsule Take 1 Cap by mouth two (2) times a day.  loratadine (CLARITIN) 10 mg tablet Take 10 mg by mouth.  esomeprazole (NEXIUM) 40 mg capsule Take  by mouth every other day. Exam:  Visit Vitals    /60    Pulse 64    Resp 18    SpO2 94%     NEUROLOGICAL EXAM:  General: Awake, alert, severe dysarthria. CN: PERRL, EOMI with impaired saccades, +nystagmus, VFF to confrontation, facial sensation and strength are normal and symmetric, hearing is intact to finger rub bilaterally, palate and tongue movements are intact and symmetric. Motor: 5-/5 UE, 4/5 proximal LE b/l  Reflexes: Deferred  Coordination: slightly ataxic FTN b/l  Sensation: LT intact throughout. Gait: Wheelchair bound, not able to ambulate    LABS  Results for orders placed or performed during the hospital encounter of 02/20/17   POC CREATININE   Result Value Ref Range    Creatinine (POC) 1.2 0.6 - 1.3 MG/DL    GFR-AA (POC) >60 >60 ml/min/1.73m2    GFR, non-AA (POC) 56 (L) >60 ml/min/1.73m2     Component      Latest Ref Rng & Units 1/9/2017 1/9/2017           3:26 PM  3:26 PM   Interpretive Comments        Comment   Anti-Neuronal Nucl. Ab, Type 1      <1:240 titer  Negative   Reflex Added        None. Anti-Neuronal Nucl. Ab, Type 2      <1:240 titer  Negative   Anti-Neuronal Nucl. Ab, Type 3      <1:240 titer  Negative   Anti-Glial Nuclear Ab Type 1      <1:240 titer  Negative   PURKINJE CELL CYTOP. AB TYPE 1      <1:240 titer  Negative   PURKINJE CELL CYTOP. AB, TYPE 2      <1:240 titer  Negative   PURKINJE CELL CYTOP.  AB TYPE TR      <1:240 titer  Negative   AMPHIPHYSIN AB, S      <1:240 titer  Negative   CRMP-5 IGG, S      <1:240 titer  Negative   STRIATIONAL MUSCLE AB <1:120 titer  Negative   CALCIUM CHANNEL, AB P/Q-TYPE      <=0.02 nmol/L  0.00   CALCIUM CHANNEL, AB N-TYPE      <=0.03 nmol/L  0.00   ACh Recp. 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 This measures  10.3 in transverse by 12.2 mm in AP dimension at the level of the frontal lobes bilaterally. Unchanged in size compared to prior study. No mass effect. Fatoumata Delaney MD 2/21/2017 11:25 AM     Addendum: Addendum:  There is complete opacification of the right frontal sinus. There is mild ethmoid sinus disease as well. Fatoumata Delaney MD 2/21/2017 10:49 AM             Narrative EXAM:  MRI BRAIN W WO CONT  History: Dizziness, and balance, speech changes, abnormal eye movements  INDICATION:  ataxia, recurrent falls, impaired saccades, nystagmus concern for  SCA vs MSA cerebellar subtype    COMPARISON: 10/27/2016. CONTRAST: The patient was administered gadolinium-based contrast material axial  and sagittal T1-weighted postcontrast enhanced imaging was obtained. TECHNIQUE: Sagittal T1, axial FLAIR, T2,T1 and gradient echo images as well as  coronal T2 weighted images and axial diffusion weighted images of the head were  obtained. FINDINGS:    There is mild/moderate for patient age sulcal and ventricular prominence. Left  temporal atrophy greater than right temporal atrophy. In the periventricular  white matter there are scattered foci of increased signal intensity, this is  mild. . There is no Chiari or syrinx. The pituitary gland and infundibulum are  unremarkable.  There is no intracranial hemorrhage. There is no intracranial  mass. There is no skull base mass. Major intracranial vascular flow-voids are  grossly unremarkable. The cerebellopontine angles are unremarkable as well. The  orbits are symmetric. The cavernous sinuses are symmetric as well. There is no  evidence of mass, hemorrhage, acute infarct or abnormal extra-axial fluid  collection. Normal appearing flow-voids are present in the vertebral, basilar  and carotid artery systems. The craniocervical junction is normal.    There is right frontal sinus disease which is moderate. The left frontal sinus  is clear minimal anterior ethmoid sinus disease. Maxillary sinuses are within  normal limits. Impression IMPRESSION:    There is no acute intracranial process. No intracranial mass, hemorrhage, acute infarction. There is mild to moderate left greater than right temporal predominant cerebral  atrophy. Mild chronic microvascular ischemic change for patient age. CUS:  1. 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 2. No elevated peak systolic velocities to suggest stenosis elsewhere in the  carotid arteries. 3. Visually normal vertebral arteries with normal peak systolic velocities and  waveforms. Assessment:     Encounter Diagnoses     ICD-10-CM ICD-9-CM   1. 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 Dizziness R42 780.4   3. Ataxia R27.0 781.3     79 yo male vasculopath here for f/u of chronic dizziness exacerbated by positional changes/movement x 5+ years with more recent increasing severity/frequency, associated imbalance, slight ataxia,  and severe dysarthria. Neurological examination with progressive dysarthria, impaired saccades, nystagmus, b/l UE ataxia and gait imbalance. Initial MRI Brain completed and no evidence of posterior circulation ischemia. Mod to severe cerebellar atophy present on review.     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 Care is supportive and he may need to consider nursing facility placement if further progression of sx. Repeat neuroimaging performed due to relatively rapid progression of above sx, however no interval change. Periodic limb movements in the evenings improved with gabapentin. Plan:   Cont. Gabapentin 600mg qhs    Follow-up Disposition:  Return if symptoms worsen or fail to improve. Signed:  Bing Chaudhry DO  3/23/2017      The duration of this appointment visit was 25 minutes of face-to-face time with the patient. At least 50% of this time was spent in counseling, explanation of diagnosis, planning of further management, and coordination of care.

## 2017-03-23 NOTE — MR AVS SNAPSHOT
Visit Information Date & Time Provider Department Dept. Phone Encounter #  
 3/23/2017 10:40 AM Morgan Peña DO [facility] at [place] [id number] [phone] Follow-up Instructions Return if symptoms worsen or fail to improve. Your Appointments 7/3/2017  2:30 PM  
ROUTINE CARE with Joey Chapman DO [facility] ([facility] Appt Note: 3 month f/u  
 [address] [facility] [address] [facility] [address]  
[phone]  
  
   
 [address] Upcoming Health Maintenance Date Due DTaP/Tdap/Td series (1 - Tdap) 12/22/1939 ZOSTER VACCINE AGE 60> 12/22/1978 GLAUCOMA SCREENING Q2Y 12/22/1983 MEDICARE YEARLY EXAM 12/22/1983 Pneumococcal 65+ Low/Medium Risk (2 of 2 - PPSV23) 11/11/2017 Allergies as of 3/23/2017  Review Complete On: 3/23/2017 By: Morgan Peña DO No Known Allergies Current Immunizations  Reviewed on 11/11/2016 Name Date Influenza High Dose Vaccine PF 9/23/2016 Pneumococcal Conjugate (PCV-13) 11/11/2016 Not reviewed this visit You Were Diagnosed With   
  
 Codes Comments Cerebellar atrophy     ICD-10-CM: G31.9 ICD-9-CM: 331.9 Dizziness     ICD-10-CM: R97 ICD-9-CM: 780.4 Ataxia     ICD-10-CM: R27.0 ICD-9-CM: 126. 3 Vitals BP Pulse Resp SpO2 Smoking Status 134/60 64 18 94% Never Smoker Vitals History Preferred Pharmacy Pharmacy Name Phone [address] [phone] Your Updated Medication List  
  
   
This list is accurate as of: 3/23/17 10:55 AM.  Always use your most recent med list.  
  
  
  
  
 AGGRENOX  mg per SR capsule Generic drug:  aspirin-dipyridamole Take 1 Cap by mouth two (2) times a day. amLODIPine 2.5 mg tablet Commonly known as:  Elspeth Bakes Take 1 Tab by mouth daily. atorvastatin 40 mg tablet Commonly known as:  LIPITOR Take  by mouth daily. CLARITIN 10 mg tablet Generic drug:  loratadine Take 10 mg by mouth. ferrous sulfate 325 mg (65 mg iron) tablet Take  by mouth Daily (before breakfast). furosemide 20 mg tablet Commonly known as:  LASIX Take  by mouth daily. gabapentin 300 mg capsule Commonly known as:  NEURONTIN Take 2 Caps by mouth nightly. Start 1 tablet qhs x 1 week, then increase to 2 tablets qhs  
  
 losartan 25 mg tablet Commonly known as:  COZAAR Take 50 mg by mouth daily. NexIUM 40 mg capsule Generic drug:  esomeprazole Take  by mouth every other day. VITAMIN C 500 mg tablet Generic drug:  ascorbic acid (vitamin C) Take  by mouth. Prescriptions Printed Refills  
 gabapentin (NEURONTIN) 300 mg capsule 4 Sig: Take 2 Caps by mouth nightly. Start 1 tablet qhs x 1 week, then increase to 2 tablets qhs  
 Class: Print Route: Oral  
  
Follow-up Instructions Return if symptoms worsen or fail to improve. Patient Instructions A Healthy Lifestyle: Care Instructions Your Care Instructions A healthy lifestyle can help you feel good, stay at a healthy weight, and have plenty of energy for both work and play. A healthy lifestyle is something you can share with your whole family. A healthy lifestyle also can lower your risk for serious health problems, such as high blood pressure, heart disease, and diabetes. You can follow a few steps listed below to improve your health and the health of your family. Follow-up care is a key part of your treatment and safety. Be sure to make and go to all appointments, and call your doctor if you are having problems. Its also a good idea to know your test results and keep a list of the medicines you take. How can you care for yourself at home? · Do not eat too much sugar, fat, or fast foods.  You can still have dessert and treats now and then. The goal is moderation. · Start small to improve your eating habits. Pay attention to portion sizes, drink less juice and soda pop, and eat more fruits and vegetables. ¨ Eat a healthy amount of food. A 3-ounce serving of meat, for example, is about the size of a deck of cards. Fill the rest of your plate with vegetables and whole grains. ¨ Limit the amount of soda and sports drinks you have every day. Drink more water when you are thirsty. ¨ Eat at least 5 servings of fruits and vegetables every day. It may seem like a lot, but it is not hard to reach this goal. A serving or helping is 1 piece of fruit, 1 cup of vegetables, or 2 cups of leafy, raw vegetables. Have an apple or some carrot sticks as an afternoon snack instead of a candy bar. Try to have fruits and/or vegetables at every meal. 
· Make exercise part of your daily routine. You may want to start with simple activities, such as walking, bicycling, or slow swimming. Try to be active 30 to 60 minutes every day. You do not need to do all 30 to 60 minutes all at once. For example, you can exercise 3 times a day for 10 or 20 minutes. Moderate exercise is safe for most people, but it is always a good idea to talk to your doctor before starting an exercise program. 
· Keep moving. [place] Feeling the lawn, work in the garden, or [email or web address]. Take the stairs instead of the elevator at work. · If you smoke, quit. People who smoke have an increased risk for heart attack, stroke, cancer, and other lung illnesses. Quitting is hard, but there are ways to boost your chance of quitting tobacco for good. ¨ Use nicotine gum, patches, or lozenges. ¨ Ask your doctor about stop-smoking programs and medicines. ¨ Keep trying.  
In addition to reducing your risk of diseases in the future, you will notice some benefits soon after you stop using tobacco. If you have shortness of breath or asthma symptoms, they will likely get better within a few weeks after you quit. · Limit how much alcohol you drink. Moderate amounts of alcohol (up to 2 drinks a day for men, 1 drink a day for women) are okay. But drinking too much can lead to liver problems, high blood pressure, and other health problems. Family health If you have a family, there are many things you can do together to improve your health. · Eat meals together as a family as often as possible. · Eat healthy foods. This includes fruits, vegetables, lean meats and dairy, and whole grains. · Include your family in your fitness plan. Most people think of activities such as jogging or tennis as the way to fitness, but there are many ways you and your family can be more active. Anything that makes you breathe hard and gets your heart pumping is exercise. Here are some tips: 
¨ Walk to do errands or to take your child to school or the bus. ¨ Go for a family bike ride after dinner instead of watching TV. Where can you learn more? Go to http://tristian-catie.info/. Enter U312 in the search box to learn more about \"A Healthy Lifestyle: Care Instructions. \" Current as of: July 26, 2016 Content Version: 11.1 © 8480-2731 [employer]. Care instructions adapted under license by [employer] (which disclaims liability or warranty for this information). If you have questions about a medical condition or this instruction, always ask your healthcare professional. [place] [address] any warranty or liability for your use of this information. Introducing [facility] [place] [facility]! Sophie Ch introduces [employer] patient portal. Now you can access parts of your medical record, email your doctor's office, and request medication refills online. 1. In your internet browser, go to [email or web address] 2. Click on the First Time User? Click Here link in the Sign In box. You will see the New Member Sign Up page. 3. Enter your Fetch It Access Code exactly as it appears below. You will not need to use this code after youve completed the sign-up process. If you do not sign up before the expiration date, you must request a new code. · Fetch It Access Code: [email or web address] Expires: 4/9/2017  3:31 PM 
 
4. Enter the last four digits of your Social Security Number (xxxx) and Date of Birth (mm/dd/yyyy) as indicated and click Submit. You will be taken to the next sign-up page. 5. Create a Fetch It ID. This will be your Fetch It login ID and cannot be changed, so think of one that is secure and easy to remember. 6. Create a Fetch It password. You can change your password at any time. 7. Enter your Password Reset Question and Answer. This can be used at a later time if you forget your password. 8. Enter your e-mail address. You will receive e-mail notification when new information is available in [address] 9. Click Sign Up. You can now view and download portions of your medical record. 10. Click the Download Summary menu link to download a portable copy of your medical information. If you have questions, please visit the Frequently Asked Questions section of the Fetch It website. Remember, Fetch It is NOT to be used for urgent needs. For medical emergencies, dial 911. Now available from your iPhone and Android! Please provide this summary of care documentation to your next provider. Your primary care clinician is listed as Gabby Gutierrez. If you have any questions after today's visit, please call [phone].

## 2017-03-23 NOTE — PATIENT INSTRUCTIONS

## 2017-03-29 NOTE — PROGRESS NOTES
HISTORY OF PRESENT ILLNESS  Harini Copeland is a 80 y.o. male. Here with his daughter for f/u. Has multiple medical problems. His neurological issues are worsening. Followed by neurology. Reports continued falls but no injury. Gait is unsteady/ataxic with leg weakness. H/o CVA with neuropathy. Has cerebellar atrophy/ ataxia. Has chronic N/V and dizziness. Meds help some. H/o CAD, BPH, and AAA. Reports compliance with medications and diet. Med list and most recent labs/studies reviewed with pt. Not very active physically due to dizziness. Mostly in  W/c. Reports no other new c/o. Follow Up Chronic Condition   Associated symptoms include abdominal pain. Pertinent negatives include no chest pain, no headaches and no shortness of breath. Dizziness    Associated symptoms include abdominal pain, nausea, back pain, dizziness and weakness. Pertinent negatives include no chest pain, no palpitations, no congestion, no headaches, no focal weakness and no seizures. Abdominal Pain   Associated symptoms include abdominal pain. Pertinent negatives include no chest pain, no headaches and no shortness of breath. Review of Systems   HENT: Positive for hearing loss. Negative for congestion. Eyes: Positive for blurred vision. Respiratory: Negative for cough, shortness of breath and wheezing. Cardiovascular: Positive for leg swelling. Negative for chest pain and palpitations. Gastrointestinal: Positive for abdominal pain and nausea. Genitourinary: Positive for urgency. Negative for dysuria. Musculoskeletal: Positive for back pain, falls and joint pain. Skin: Negative. Neurological: Positive for dizziness, tremors, sensory change, speech change and weakness. Negative for tingling, focal weakness, seizures, loss of consciousness and headaches. Endo/Heme/Allergies: Negative. Psychiatric/Behavioral: Negative. Physical Exam   Constitutional: He is oriented to person, place, and time.  He appears well-developed and well-nourished. No distress. Elderly  pleasant   HENT:   Head: Normocephalic and atraumatic. Mouth/Throat: Oropharynx is clear and moist.   Bilateral hearing aids  Slow slurred speech     Eyes: Conjunctivae are normal.   Neck: Neck supple. No JVD present. No thyromegaly present. Cardiovascular: Normal rate, regular rhythm, normal heart sounds and intact distal pulses. Pulmonary/Chest: Effort normal and breath sounds normal. No respiratory distress. He has no wheezes. He has no rales. Abdominal: Soft. Bowel sounds are normal. He exhibits no distension. There is no tenderness. Musculoskeletal: He exhibits edema (pedal, mild). Bilateral lower extremity edema to knees 1+ with chronic skin change   Neurological: He is alert and oriented to person, place, and time. He exhibits abnormal muscle tone. Coordination abnormal.   Generalized weakness   Skin: Skin is warm and dry. Psychiatric: He has a normal mood and affect. His behavior is normal.   Nursing note and vitals reviewed. ASSESSMENT and PLAN  Paula Severe was seen today for follow up chronic condition, dizziness, abdominal pain and decreased appetite. Diagnoses and all orders for this visit:    Neurodegenerative disorder    Cerebellar atrophy    Cerebral microvascular disease    Dizziness    Gait difficulty    Chronic nausea    Coronary artery disease involving native heart without angina pectoris, unspecified vessel or lesion type    Dysarthria      Follow-up Disposition:  Return in about 3 months (around 6/13/2017). lab results and schedule of future lab studies reviewed with patient  reviewed diet, exercise and weight control  reviewed medications and side effects in detail  F/u with other MD's as scheduled  Explained that there is nothing else to offer. I don't recommend any further testing.   Supportive care  Focus on comfort

## 2017-05-05 ENCOUNTER — HOSPITAL ENCOUNTER (OUTPATIENT)
Dept: GENERAL RADIOLOGY | Age: 82
Discharge: HOME | End: 2017-05-05
Attending: INTERNAL MEDICINE
Payer: MEDICARE

## 2017-05-05 DIAGNOSIS — R63.4 WEIGHT LOSS: ICD-10-CM

## 2017-05-05 DIAGNOSIS — R10.9 ABDOMINAL PAIN: ICD-10-CM

## 2017-05-05 PROCEDURE — 74245 XR UPPER GI/SMALL BOWEL: CPT
